# Patient Record
Sex: FEMALE | Race: WHITE | NOT HISPANIC OR LATINO | Employment: OTHER | URBAN - METROPOLITAN AREA
[De-identification: names, ages, dates, MRNs, and addresses within clinical notes are randomized per-mention and may not be internally consistent; named-entity substitution may affect disease eponyms.]

---

## 2019-09-24 ENCOUNTER — HISTORICAL (OUTPATIENT)
Dept: PREADMISSION TESTING | Facility: HOSPITAL | Age: 74
End: 2019-09-24

## 2019-09-24 LAB
ABS NEUT (OLG): 1.94 X10(3)/MCL (ref 2.1–9.2)
ALBUMIN SERPL-MCNC: 4.5 GM/DL (ref 3.4–5)
ALBUMIN/GLOB SERPL: 1.9 {RATIO}
ALP SERPL-CCNC: 70 UNIT/L (ref 38–126)
ALT SERPL-CCNC: 24 UNIT/L (ref 12–78)
APTT PPP: 31.2 SECOND(S) (ref 24.2–33.9)
AST SERPL-CCNC: 16 UNIT/L (ref 15–37)
BASOPHILS # BLD AUTO: 0 X10(3)/MCL (ref 0–0.2)
BASOPHILS NFR BLD AUTO: 1 %
BILIRUB SERPL-MCNC: 0.8 MG/DL (ref 0.2–1)
BILIRUBIN DIRECT+TOT PNL SERPL-MCNC: 0.1 MG/DL (ref 0–0.2)
BILIRUBIN DIRECT+TOT PNL SERPL-MCNC: 0.7 MG/DL (ref 0–0.8)
BUN SERPL-MCNC: 10 MG/DL (ref 7–18)
CALCIUM SERPL-MCNC: 9.6 MG/DL (ref 8.5–10.1)
CHLORIDE SERPL-SCNC: 104 MMOL/L (ref 98–107)
CO2 SERPL-SCNC: 31 MMOL/L (ref 21–32)
CREAT SERPL-MCNC: 0.63 MG/DL (ref 0.55–1.02)
EOSINOPHIL # BLD AUTO: 0.2 X10(3)/MCL (ref 0–0.9)
EOSINOPHIL NFR BLD AUTO: 4 %
ERYTHROCYTE [DISTWIDTH] IN BLOOD BY AUTOMATED COUNT: 13.1 % (ref 11.5–17)
GLOBULIN SER-MCNC: 2.4 GM/DL (ref 2.4–3.5)
GLUCOSE SERPL-MCNC: 93 MG/DL (ref 74–106)
HCT VFR BLD AUTO: 37.5 % (ref 37–47)
HGB BLD-MCNC: 12.6 GM/DL (ref 12–16)
INR PPP: 1 (ref 0–1.3)
LYMPHOCYTES # BLD AUTO: 2.2 X10(3)/MCL (ref 0.6–4.6)
LYMPHOCYTES NFR BLD AUTO: 47 %
MCH RBC QN AUTO: 31.8 PG (ref 27–31)
MCHC RBC AUTO-ENTMCNC: 33.6 GM/DL (ref 33–36)
MCV RBC AUTO: 94.7 FL (ref 80–94)
MONOCYTES # BLD AUTO: 0.3 X10(3)/MCL (ref 0.1–1.3)
MONOCYTES NFR BLD AUTO: 6 %
NEUTROPHILS # BLD AUTO: 1.94 X10(3)/MCL (ref 2.1–9.2)
NEUTROPHILS NFR BLD AUTO: 41 %
PLATELET # BLD AUTO: 297 X10(3)/MCL (ref 130–400)
PMV BLD AUTO: 10.2 FL (ref 9.4–12.4)
POTASSIUM SERPL-SCNC: 4.2 MMOL/L (ref 3.5–5.1)
PROT SERPL-MCNC: 6.9 GM/DL (ref 6.4–8.2)
PROTHROMBIN TIME: 13.4 SECOND(S) (ref 12–14)
RBC # BLD AUTO: 3.96 X10(6)/MCL (ref 4.2–5.4)
SODIUM SERPL-SCNC: 140 MMOL/L (ref 136–145)
WBC # SPEC AUTO: 4.7 X10(3)/MCL (ref 4.5–11.5)

## 2019-10-09 ENCOUNTER — HISTORICAL (OUTPATIENT)
Dept: SURGERY | Facility: HOSPITAL | Age: 74
End: 2019-10-09

## 2020-07-06 RX ORDER — LOSARTAN POTASSIUM 100 MG/1
TABLET ORAL
COMMUNITY
End: 2022-06-07

## 2020-07-06 RX ORDER — HYDROCHLOROTHIAZIDE 12.5 MG/1
TABLET ORAL
COMMUNITY
End: 2021-10-14

## 2020-07-06 RX ORDER — AMITRIPTYLINE HYDROCHLORIDE 25 MG/1
TABLET, FILM COATED ORAL
COMMUNITY
End: 2021-10-14

## 2020-07-06 RX ORDER — LOSARTAN POTASSIUM AND HYDROCHLOROTHIAZIDE 12.5; 1 MG/1; MG/1
1 TABLET ORAL DAILY
COMMUNITY
Start: 2020-05-27 | End: 2021-10-14

## 2020-07-06 NOTE — TELEPHONE ENCOUNTER
Spoke with the patient and she would like a Rx for the Premarin cream sent to her pharmacy and she would also like to  a couple more samples of the premarin either later this week or next week. Pharmacy updated.

## 2020-07-06 NOTE — TELEPHONE ENCOUNTER
----- Message from La Nena Reyes sent at 7/6/2020 11:38 AM CDT -----  Regarding: Sample medication  Patient need to speak to nurse regarding sample medications given to her. The patient would like a prescription for the sample medication. Call back number [phone]. Tks

## 2020-07-09 ENCOUNTER — TELEPHONE (OUTPATIENT)
Dept: OBSTETRICS AND GYNECOLOGY | Facility: CLINIC | Age: 75
End: 2020-07-09

## 2020-07-09 DIAGNOSIS — Z78.0 MENOPAUSE: Primary | ICD-10-CM

## 2020-07-09 RX ORDER — ESTRADIOL 0.1 MG/G
1 CREAM VAGINAL
Qty: 42.5 G | Refills: 1 | Status: SHIPPED | OUTPATIENT
Start: 2020-07-09 | End: 2021-10-14

## 2020-07-09 NOTE — TELEPHONE ENCOUNTER
----- Message from Radha Steinberg sent at 7/9/2020 10:22 AM CDT -----  Pt calling to discuss the conjugated estrogens (PREMARIN) vaginal cream she stated it is to high at the pharmacy please give her a call. [phone]       Thanks   Radha Steinberg

## 2020-10-01 ENCOUNTER — TELEPHONE (OUTPATIENT)
Dept: OBSTETRICS AND GYNECOLOGY | Facility: CLINIC | Age: 75
End: 2020-10-01

## 2020-10-01 NOTE — TELEPHONE ENCOUNTER
----- Message from Torrie Villa sent at 10/1/2020  9:28 AM CDT -----  Patient has an appointment 10/19/2020 states she had surgery in August and states there is no way Dr Hutson can exam her but is in town today she lives in [place] & would like to get her Mammogram orders

## 2020-12-28 ENCOUNTER — OFFICE VISIT (OUTPATIENT)
Dept: UROLOGY | Facility: CLINIC | Age: 75
End: 2020-12-28
Payer: MEDICARE

## 2020-12-28 VITALS
DIASTOLIC BLOOD PRESSURE: 71 MMHG | SYSTOLIC BLOOD PRESSURE: 124 MMHG | RESPIRATION RATE: 17 BRPM | BODY MASS INDEX: 25.68 KG/M2 | HEIGHT: 61 IN | WEIGHT: 136 LBS

## 2020-12-28 DIAGNOSIS — R30.0 DYSURIA: Primary | ICD-10-CM

## 2020-12-28 PROCEDURE — 99213 PR OFFICE/OUTPT VISIT, EST, LEVL III, 20-29 MIN: ICD-10-PCS | Mod: S$GLB,,, | Performed by: UROLOGY

## 2020-12-28 PROCEDURE — 99213 OFFICE O/P EST LOW 20 MIN: CPT | Mod: S$GLB,,, | Performed by: UROLOGY

## 2020-12-28 NOTE — PROGRESS NOTES
Subjective:       Patient ID: Dottie Xie is a 75 y.o. female.    Chief Complaint: Follow-up      HPI: History of utis yearly fu has had no recurrences is past year       Past Medical History:   Past Medical History:   Diagnosis Date    HTN (hypertension)        Past Surgical Historical:   Past Surgical History:   Procedure Laterality Date    BREAST LUMPECTOMY Right     CATARACT EXTRACTION      HYSTERECTOMY          Medications:   Medication List with Changes/Refills   Current Medications    AMITRIPTYLINE (ELAVIL) 25 MG TABLET    amitriptyline 25 mg tablet   TAKE ONE TABLET BY MOUTH ONCE DAILY    CONJUGATED ESTROGENS (PREMARIN) VAGINAL CREAM    Place 1 g vaginally twice a week.    ESTRADIOL (ESTRACE) 0.01 % (0.1 MG/GRAM) VAGINAL CREAM    Place 1 g vaginally every 7 days.    HYDROCHLOROTHIAZIDE (HYDRODIURIL) 12.5 MG TAB    hydrochlorothiazide 12.5 mg tablet   Take 1 tablet every day by oral route.    LOSARTAN (COZAAR) 100 MG TABLET    losartan 100 mg tablet   Take 1/2 tablet po daily for 7 days then take 1 po daily thereafter    LOSARTAN-HYDROCHLOROTHIAZIDE 100-12.5 MG (HYZAAR) 100-12.5 MG TAB    Take 1 tablet by mouth once daily.        Past Social History:   Social History     Socioeconomic History    Marital status:      Spouse name: Not on file    Number of children: Not on file    Years of education: Not on file    Highest education level: Not on file   Occupational History    Not on file   Social Needs    Financial resource strain: Not on file    Food insecurity     Worry: Not on file     Inability: Not on file    Transportation needs     Medical: Not on file     Non-medical: Not on file   Tobacco Use    Smoking status: Never Smoker   Substance and Sexual Activity    Alcohol use: Yes     Comment: OCC.    Drug use: Not on file    Sexual activity: Not on file   Lifestyle    Physical activity     Days per week: Not on file     Minutes per session: Not on file    Stress: Not on file    Relationships    Social connections     Talks on phone: Not on file     Gets together: Not on file     Attends [employer] service: Not on file     Active member of club or organization: Not on file     Attends meetings of clubs or organizations: Not on file     Relationship status: Not on file   Other Topics Concern    Not on file   Social History Narrative    Not on file       Allergies:   Review of patient's allergies indicates:   Allergen Reactions    Penicillins Rash    Triple antibiotic  [dxust-tmeht-vnjaych-pramoxine] Rash        Family History: No family history on file.     Review of Systems:  Review of Systems   Constitutional: Negative for activity change and appetite change.   HENT: Negative for congestion and dental problem.    Respiratory: Negative for chest tightness and shortness of breath.    Cardiovascular: Negative for chest pain.   Gastrointestinal: Negative for abdominal distention.   Genitourinary: Negative for decreased urine volume, difficulty urinating, dyspareunia, dysuria, enuresis, flank pain, frequency, genital sores, hematuria, pelvic pain and urgency.   Musculoskeletal: Negative for back pain and neck pain.   Allergic/Immunologic: Negative for immunocompromised state.   Neurological: Negative for dizziness.   Hematological: Negative for adenopathy.   Psychiatric/Behavioral: Negative for agitation, behavioral problems and confusion.       Physical Exam:  Physical Exam  Vitals signs and nursing note reviewed.   Constitutional:       Appearance: She is well-developed.   HENT:      Head: Normocephalic.   Cardiovascular:      Rate and Rhythm: Normal rate and regular rhythm.      Heart sounds: Normal heart sounds.   Pulmonary:      Effort: Pulmonary effort is normal.      Breath sounds: Normal breath sounds.   Abdominal:      General: Bowel sounds are normal.      Palpations: Abdomen is soft.   Skin:     General: Skin is warm and dry.   Neurological:      Mental Status: She is alert and  oriented to person, place, and time.     ua is normal    Assessment/Plan:     utis with no recurrences fu in one year  Problem List Items Addressed This Visit     None      Visit Diagnoses     Dysuria    -  Primary    Relevant Orders    POCT Urinalysis (w/Micro Option)

## 2021-10-14 ENCOUNTER — OFFICE VISIT (OUTPATIENT)
Dept: OBSTETRICS AND GYNECOLOGY | Facility: CLINIC | Age: 76
End: 2021-10-14
Payer: MEDICARE

## 2021-10-14 VITALS
HEIGHT: 61 IN | BODY MASS INDEX: 27.56 KG/M2 | WEIGHT: 146 LBS | HEART RATE: 73 BPM | DIASTOLIC BLOOD PRESSURE: 68 MMHG | SYSTOLIC BLOOD PRESSURE: 143 MMHG

## 2021-10-14 DIAGNOSIS — Z00.00 ENCOUNTER FOR WELLNESS EXAMINATION: Primary | ICD-10-CM

## 2021-10-14 DIAGNOSIS — Z12.31 ENCOUNTER FOR SCREENING MAMMOGRAM FOR MALIGNANT NEOPLASM OF BREAST: ICD-10-CM

## 2021-10-14 PROCEDURE — G0101 CA SCREEN;PELVIC/BREAST EXAM: HCPCS | Mod: S$GLB,,, | Performed by: OBSTETRICS & GYNECOLOGY

## 2021-10-14 PROCEDURE — G0101 PR CA SCREEN;PELVIC/BREAST EXAM: ICD-10-PCS | Mod: S$GLB,,, | Performed by: OBSTETRICS & GYNECOLOGY

## 2021-10-14 RX ORDER — ESTRADIOL 0.1 MG/G
CREAM VAGINAL DAILY
COMMUNITY
End: 2021-12-21

## 2021-12-08 ENCOUNTER — TELEPHONE (OUTPATIENT)
Dept: OBSTETRICS AND GYNECOLOGY | Facility: CLINIC | Age: 76
End: 2021-12-08
Payer: MEDICARE

## 2022-01-12 ENCOUNTER — OFFICE VISIT (OUTPATIENT)
Dept: UROLOGY | Facility: CLINIC | Age: 77
End: 2022-01-12
Payer: MEDICARE

## 2022-01-12 DIAGNOSIS — R30.0 DYSURIA: Primary | ICD-10-CM

## 2022-01-12 PROCEDURE — 99213 OFFICE O/P EST LOW 20 MIN: CPT | Mod: S$GLB,,, | Performed by: NURSE PRACTITIONER

## 2022-01-12 PROCEDURE — 99213 PR OFFICE/OUTPT VISIT, EST, LEVL III, 20-29 MIN: ICD-10-PCS | Mod: S$GLB,,, | Performed by: NURSE PRACTITIONER

## 2022-01-12 RX ORDER — CODEINE PHOSPHATE AND GUAIFENESIN 10; 100 MG/5ML; MG/5ML
SOLUTION ORAL
COMMUNITY
Start: 2022-01-04 | End: 2022-06-07

## 2022-01-12 RX ORDER — LOSARTAN POTASSIUM AND HYDROCHLOROTHIAZIDE 12.5; 1 MG/1; MG/1
TABLET ORAL
COMMUNITY
Start: 2021-12-13

## 2022-01-12 NOTE — PROGRESS NOTES
Subjective:       Patient ID: Dottie Xie is a 76 y.o. female.    Chief Complaint: Other (Yearly, denies any issues)      HPI: 76-year-old female yearly follow-up history of atrophic vaginitis and recurring UTI.  She has done well since going on Estrace vaginal cream.  She continue same.  She has had no UTIs over the last year.  She has no urologic concerns today.       Past Medical History:   Past Medical History:   Diagnosis Date    Atrophic vaginitis     Bone disorder     HTN (hypertension)     Insomnia     Menopausal syndrome     Osteopenia        Past Surgical Historical:   Past Surgical History:   Procedure Laterality Date    BREAST LUMPECTOMY Right     CATARACT EXTRACTION      FOOT SURGERY      hemangioma removal      LEG SURGERY      TOTAL ABDOMINAL HYSTERECTOMY      WITHOUT BSO        Medications:   Medication List with Changes/Refills   Current Medications    ESTRADIOL (ESTRACE) 0.01 % (0.1 MG/GRAM) VAGINAL CREAM    APPLY 1 GRAM VAGINALLY EVERY 7 DAYS    GUAIFENESIN-CODEINE 100-10 MG/5 ML (TUSSI-ORGANIDIN NR)  MG/5 ML SYRUP    TAKE 2 TEASPOONFULS (10 ML) BY MOUTH EVERY 4 TO 6 HOURS AS NEEDED    LOSARTAN (COZAAR) 100 MG TABLET    losartan 100 mg tablet   Take 1/2 tablet po daily for 7 days then take 1 po daily thereafter    LOSARTAN-HYDROCHLOROTHIAZIDE 100-12.5 MG (HYZAAR) 100-12.5 MG TAB            Past Social History:   Social History     Socioeconomic History    Marital status:    Tobacco Use    Smoking status: Never Smoker    Smokeless tobacco: Never Used   Substance and Sexual Activity    Alcohol use: Yes     Comment: OCC.    Drug use: Never    Sexual activity: Not Currently     Partners: Male     Birth control/protection: See Surgical Hx       Allergies:   Review of patient's allergies indicates:   Allergen Reactions    Vicodin [hydrocodone-acetaminophen]     Penicillins Rash    Triple antibiotic  [vtqzl-zgdxh-xtlicrs-pramoxine] Rash        Family History:   Family  History   Problem Relation Age of Onset    Breast cancer Maternal Grandmother         Review of Systems:  Review of Systems   Constitutional: Negative for activity change and appetite change.   HENT: Negative for congestion and dental problem.    Respiratory: Negative for chest tightness and shortness of breath.    Cardiovascular: Negative for chest pain.   Gastrointestinal: Negative for abdominal distention.   Genitourinary: Negative for decreased urine volume, difficulty urinating, dyspareunia, dysuria, enuresis, flank pain, frequency, genital sores, hematuria, pelvic pain and urgency.   Musculoskeletal: Negative for back pain and neck pain.   Allergic/Immunologic: Negative for immunocompromised state.   Neurological: Negative for dizziness.   Hematological: Negative for adenopathy.   Psychiatric/Behavioral: Negative for agitation, behavioral problems and confusion.       Physical Exam:  Physical Exam  Constitutional:       Appearance: She is well-developed and well-nourished.   HENT:      Head: Normocephalic and atraumatic.   Eyes:      General: No scleral icterus.  Cardiovascular:      Pulses: Intact distal pulses.   Pulmonary:      Effort: Pulmonary effort is normal.      Breath sounds: Normal breath sounds.   Abdominal:      General: There is no distension.      Palpations: Abdomen is soft.      Tenderness: There is no abdominal tenderness.   Genitourinary:     Exam position: Supine.      Labia:         Right: No rash, tenderness or lesion.         Left: No rash, tenderness or lesion.       Vagina: Normal.      Rectum: Normal.   Musculoskeletal:         General: No edema.      Cervical back: Normal range of motion.   Skin:     General: Skin is warm and dry.   Neurological:      Mental Status: She is alert and oriented to person, place, and time.   Psychiatric:         Mood and Affect: Mood and affect normal.         Assessment/Plan:   Atrophic vaginitis--continue Estrace vaginal cream adequate supply on hand.     Problem List Items Addressed This Visit    None

## 2022-04-30 NOTE — OP NOTE
DATE OF SURGERY:        SURGEON:  Ike Watson MD  ASSISTANT:  Carmen Barnett RN    PREOPERATIVE DIAGNOSES:    1. Blepharoptosis.  2. Aging face.    POSTOPERATIVE DIAGNOSES:    1. Blepharoptosis.  2. Aging face.    PROCEDURES:    1. Ptosis repair utilizing an external approach bilaterally.  2. Suborbicularis oculi fat lift lower lid blepharoplasty with canthopexy.  3. Cervical facial liposuction, platysmaplasty and face lift.  4. Periorbital and perioral fractionated laser resurfacing.    PROCEDURE IN DETAIL:  The patient was brought to the operating room and placed in supine position.  After achieving intravenous sedation with local anesthesia and infiltration with 2% lidocaine with 1:100,000 epinephrine, the face was prepped and draped for surgery.  Addressing the right eye first, a predetermined amount of upper eyelid skin was removed.  A strip of orbicularis oculi muscle was taken and hemostasis achieved using bipolar cautery.  The orbital septum was opened exposing the periorbital fat which was retracted superiorly.  Then, the levator was shortened appropriately, exposed the anterior surface of the tarsus with 6-0 Vicryl sutures used to sew the freshened edge of the tarsus to the anterior surface of the levator.  Patient opened eyes showing marked improvement in MRD.  The same procedure was done on the opposite side.  The wound was then closed with 6-0 nylon in subcuticular fashion, where the area was stabilized with a Steri-Strip over the forehead.  Once this was done, general endotracheal anesthesia was instituted.     Lower blepharoplasty with SOOF lift fat preservation:     We turned our attention to a fat preservation lower lid blepharoplasty with lateral canthopexy which was done by using a #15 blade to make an incision in the lateral canthus.  With the use of tenotomy scissors an inferior cantholysis was done and with the use of [place] attachment of the Bovie, a transconjunctival incision  was made going through the conjunctiva and lower lid retractors.  Once this was completed, fat in the lateral most compartments was removed using the clamp, cut, cautery technique while fat in the nasal and central compartments was not resected.  However, the fat in these areas were sewn to the SOOF beneath the orbicularis oculi muscle.  This was done with 4-0 Vicryl sutures in an interrupted fashion.  Once this was done, the septum was cauterized and the lower eyelid retracted laterally and superiorly where excess lid was removed with sharp dissection.  A 5-0 clear nylon suture was used to approximate the tarsus to the periosteum of the lateral rim.  5-0 plain sutures were then used to reattach the gray line as well as to reapproximate the remaining skin edges.  This was also done bilaterally.  The cornea protectors were then removed and a generous amount of Lacri-Lube was placed over the eye and eye was covered with cool compresses.  The patient tolerated the procedure well and was extubated and awakened in the operating room and brought to the recovery room in stable condition.     After completion of the blepharoplasty, we turned our attention to the canthopexy to tighten the lower lid where an incision was made just in the crow feet region where a strip of orbicularis was taken, as well as skin, and a 5-0 PDS was then used to plicate to the lateral orbital rim, in an attempt to tighten the lid, it was then closed with 5-0 fast-absorbing gut sutures.  This was done bilaterally as well.       The face and neck were prepped and draped for a facelift surgery.  Once this was completed, facelift incisions were made in the pre-auricular area as well as the post-auricular areas and out to the occipital scalp, as well as the small incision that was made in the submental region.       With this, close liposuction was done by making tunnels prior to turning the vacuum going extensively into the submental region as well as  into the jowls bilaterally.  Once the tunnels were made, the vacuum was turned on and liposuction was carried out throughout both jowls as well as in the submental region with care being taken to leave the open end of the cannula downward and using a rapid to and fro motion, until an appropriate amount of fat was removed.  Then the medial border of the platysma were grasped and sutured in running lock fashion with 3-0 Vicryl suture.       Once this was done, we turned our attention to the facelift where anterior, inferior and posterior flaps were elevated in the subcutaneous plane.  Hemostasis was achieved using the bipolar cautery.  A limited amount of liposuction was done in the pre-auricular region and infralobular region and hemostasis was achieved using the bipolar cautery.  Using a 2-0 Prolene suture the posterior edge of the platysma was sutured to mastoid periosteum.  Using 2-0 PDS sutures, the SMAS was plicated.  Once this was done, the excess skin in front of the ears was pulled anteriorly and behind the ear posteriorly and the excess skin was removed.  The skin incisions in the scalp were closed with clips while the pre-auricular incisions was closed with 5-0 fast absorbing gut and the post-auricular incisions were closed with 5-0 plain sutures all in a running lock fashion after spraying Crosseal diffusely over the underlying tissue.  This procedure was done bilaterally.     After completion of that, we turned our attention the fractional laser resurfacing where all operating room personnel were protected with cool clear goggles, the patient with lead shields, and using the laser in the following manner, the periorbital area was done with 1 pass under the lower lid at 22 bedolla 2 milliseconds and 80% overlap, while the lateral canthal crow feet region was done with 2 passes in at the same setting, as was the perioral region done with 2 passes at the same settings.  Vaseline was generously applied to these  areas.     The patient tolerated the procedure well and was extubated and awakened in the operating room and brought to the recovery room in stable condition.        ______________________________  Ike Watson MD    [facility]  DD:  10/16/2019  Time:  01:34PM  DT:  10/16/2019  Time:  02:04PM  Job #:  [id number]

## 2022-04-30 NOTE — OP NOTE
Patient:   Dottie Xie            MRN: [id number]            FIN: [id number]               Age:   73 years     Sex:  Female     :  1945   Associated Diagnoses:   None   Author:   Paul Ruiz MD            DATE OF SURGERY: 10/9/19       SURGEON:  Paul Ruiz MD    ASSISTANT:  None    PREOPERATIVE DIAGNOSIS:  Soft Tissue Mass - Left Upper Extremity    POSTOPERATIVE DIAGNOSIS:  Same.    OPERATIONS:  Excision of Soft Tissue Mass    Anesthesia:  General IV sedation / Local   Estimated blood loss: Minimal (< 20)  Blood administered:  None.   Lap and instrument counts correct x 2 at the end of the case.    SPECIMEN: Soft tissue Mass approximately 3 cm    INDICATIONS/SIGNIFICANT HISTORY:  The patient is a 74 year old female who seen with complaints of a soft tissue mass.   Risks and Benefits of surgical excision was discussed with the patient, who voiced understanding of risks and benefits and elected to proceed with surgery.    PROCEDURE IN DETAIL:  Once informed consents were obtained, the patient was taken to the operating room and placed on the operating table.  After anesthesia was induced, the marked area was prepped and draped in a standard surgical fashion.  Local anesthesia was obtained with 0.25% marcaine with epinephrine.  An incision was made around the soft tissue mass and it was dissected with blunt and sharp dissection which appeared intra-muscular and the mass was passed off the table as a specimen.  The wound was then irrigated and dried and bleeding stopped with cautery.  The skin was then close with suture.  The wounds were cleaned and dried and steri-strips were applied.  The patient tolerated the procedure well and was transported to recovery room in good condition.

## 2022-05-11 ENCOUNTER — TELEPHONE (OUTPATIENT)
Dept: OBSTETRICS AND GYNECOLOGY | Facility: CLINIC | Age: 77
End: 2022-05-11
Payer: MEDICARE

## 2022-05-11 NOTE — TELEPHONE ENCOUNTER
----- Message from America Ramirez sent at 5/11/2022  3:14 PM CDT -----  Contact: Dottie Weldon is requesting a call to schedule an new pt appointment. Please call her back at [phone].        Thanks  DD

## 2022-06-07 ENCOUNTER — OFFICE VISIT (OUTPATIENT)
Dept: OBSTETRICS AND GYNECOLOGY | Facility: CLINIC | Age: 77
End: 2022-06-07
Payer: MEDICARE

## 2022-06-07 ENCOUNTER — OFFICE VISIT (OUTPATIENT)
Dept: UROLOGY | Facility: CLINIC | Age: 77
End: 2022-06-07
Payer: MEDICARE

## 2022-06-07 VITALS
SYSTOLIC BLOOD PRESSURE: 164 MMHG | BODY MASS INDEX: 28.32 KG/M2 | WEIGHT: 150 LBS | HEIGHT: 61 IN | DIASTOLIC BLOOD PRESSURE: 81 MMHG

## 2022-06-07 VITALS
SYSTOLIC BLOOD PRESSURE: 135 MMHG | WEIGHT: 146 LBS | HEIGHT: 61 IN | BODY MASS INDEX: 27.56 KG/M2 | DIASTOLIC BLOOD PRESSURE: 64 MMHG | HEART RATE: 64 BPM

## 2022-06-07 DIAGNOSIS — N95.2 ATROPHIC VAGINITIS: Primary | ICD-10-CM

## 2022-06-07 DIAGNOSIS — N90.89 VULVAR LESION: Primary | ICD-10-CM

## 2022-06-07 PROCEDURE — 99213 OFFICE O/P EST LOW 20 MIN: CPT | Mod: S$GLB,,, | Performed by: OBSTETRICS & GYNECOLOGY

## 2022-06-07 PROCEDURE — 99213 OFFICE O/P EST LOW 20 MIN: CPT | Mod: S$GLB,,, | Performed by: UROLOGY

## 2022-06-07 PROCEDURE — 99213 PR OFFICE/OUTPT VISIT, EST, LEVL III, 20-29 MIN: ICD-10-PCS | Mod: S$GLB,,, | Performed by: UROLOGY

## 2022-06-07 PROCEDURE — 99213 PR OFFICE/OUTPT VISIT, EST, LEVL III, 20-29 MIN: ICD-10-PCS | Mod: S$GLB,,, | Performed by: OBSTETRICS & GYNECOLOGY

## 2022-06-07 NOTE — PROGRESS NOTES
Subjective:       Patient ID: Dottie Xie is a 76 y.o. female.    Chief Complaint:  No chief complaint on file.      History of Present Illness  HPI  Patient presents today with complaints of lesion on the labia     GYN & OB History  No LMP recorded. Patient has had a hysterectomy.   Date of Last Pap: No result found    OB History    Para Term  AB Living   2 2 2         SAB IAB Ectopic Multiple Live Births                  # Outcome Date GA Lbr Aram/2nd Weight Sex Delivery Anes PTL Lv   2 Term      Vag-Spont      1 Term      Vag-Spont          Review of Systems  Review of Systems   Gastrointestinal: Negative for abdominal pain, bloating, blood in stool, constipation, diarrhea, nausea, vomiting, reflux and fecal incontinence.   Genitourinary: Negative for bladder incontinence, decreased libido, dysmenorrhea, dyspareunia, dysuria, flank pain, frequency, genital sores, hematuria, hot flashes, menorrhagia, menstrual problem, pelvic pain, urgency, vaginal bleeding, vaginal discharge, vaginal pain, urinary incontinence, postcoital bleeding, postmenopausal bleeding, vaginal dryness and vaginal odor.        Lesion on the left lower labia            Objective:    Physical Exam:               Genitourinary:          Genitourinary Comments: 1 cm area non tender mobile mass under the skin                           Assessment:        1. Vulvar lesion       Vulvar lesion               Plan:      Follow up in about 1 week (around 2022).  When in 1 week to see if this has formed anything that I can remove this is nonpainful at this time

## 2022-06-07 NOTE — PROGRESS NOTES
Subjective:       Patient ID: Dottie Xie is a 76 y.o. female.    Chief Complaint: Dysuria      HPI:  76-year-old female followed for atrophic vaginitis she has been given Estrace vaginal cream.  She states this is been working she is continuing to take this this time does not need a refill.  He occasionally has urinary tract infections urinalysis today was normal    Past Medical History:   Past Medical History:   Diagnosis Date    Atrophic vaginitis     Bone disorder     HTN (hypertension)     Insomnia     Menopausal syndrome     Osteopenia        Past Surgical Historical:   Past Surgical History:   Procedure Laterality Date    BREAST LUMPECTOMY Right     CATARACT EXTRACTION      FOOT SURGERY      hemangioma removal      LEG SURGERY      TOTAL ABDOMINAL HYSTERECTOMY      WITHOUT BSO        Medications:   Medication List with Changes/Refills   Current Medications    ESTRADIOL (ESTRACE) 0.01 % (0.1 MG/GRAM) VAGINAL CREAM    APPLY 1 GRAM VAGINALLY EVERY 7 DAYS    LOSARTAN-HYDROCHLOROTHIAZIDE 100-12.5 MG (HYZAAR) 100-12.5 MG TAB       Discontinued Medications    GUAIFENESIN-CODEINE 100-10 MG/5 ML (TUSSI-ORGANIDIN NR)  MG/5 ML SYRUP    TAKE 2 TEASPOONFULS (10 ML) BY MOUTH EVERY 4 TO 6 HOURS AS NEEDED    LOSARTAN (COZAAR) 100 MG TABLET    losartan 100 mg tablet   Take 1/2 tablet po daily for 7 days then take 1 po daily thereafter        Past Social History:   Social History     Socioeconomic History    Marital status:    Tobacco Use    Smoking status: Never Smoker    Smokeless tobacco: Never Used   Substance and Sexual Activity    Alcohol use: Yes     Comment: OCC.    Drug use: Never    Sexual activity: Not Currently     Partners: Male     Birth control/protection: See Surgical Hx       Allergies:   Review of patient's allergies indicates:   Allergen Reactions    Vicodin [hydrocodone-acetaminophen]     Penicillins Rash    Triple antibiotic  [dpnbl-zhotb-voypzpy-pramoxine] Rash         Family History:   Family History   Problem Relation Age of Onset    Breast cancer Maternal Grandmother         Review of Systems:  Review of Systems   Constitutional: Negative for activity change and appetite change.   HENT: Negative for congestion and dental problem.    Respiratory: Negative for chest tightness and shortness of breath.    Cardiovascular: Negative for chest pain.   Gastrointestinal: Negative for abdominal distention and abdominal pain.   Genitourinary: Negative for decreased urine volume, difficulty urinating, dyspareunia, dysuria, enuresis, flank pain, frequency, genital sores, hematuria, pelvic pain and urgency.   Musculoskeletal: Negative for back pain and neck pain.   Allergic/Immunologic: Negative for immunocompromised state.   Neurological: Negative for dizziness.   Hematological: Negative for adenopathy.   Psychiatric/Behavioral: Negative for agitation, behavioral problems and confusion.       Physical Exam:  Physical Exam  Constitutional:       Appearance: She is well-developed.   HENT:      Head: Normocephalic and atraumatic.      Right Ear: External ear normal.      Left Ear: External ear normal.   Eyes:      Conjunctiva/sclera: Conjunctivae normal.   Neck:      Vascular: No JVD.   Cardiovascular:      Rate and Rhythm: Normal rate and regular rhythm.   Pulmonary:      Effort: Pulmonary effort is normal. No respiratory distress.      Breath sounds: Normal breath sounds. No wheezing.   Abdominal:      General: There is no distension.      Palpations: Abdomen is soft.      Tenderness: There is no abdominal tenderness. There is no rebound.   Musculoskeletal:         General: Normal range of motion.      Cervical back: Normal range of motion.   Skin:     General: Skin is warm and dry.      Findings: No erythema or rash.   Neurological:      Mental Status: She is alert and oriented to person, place, and time.   Psychiatric:         Behavior: Behavior normal.         Assessment/Plan:        Problem List Items Addressed This Visit    None            Atrophic vaginitis:  We will refill her Estrace as needed return to clinic in a year

## 2022-06-13 ENCOUNTER — PROCEDURE VISIT (OUTPATIENT)
Dept: OBSTETRICS AND GYNECOLOGY | Facility: CLINIC | Age: 77
End: 2022-06-13
Payer: MEDICARE

## 2022-06-13 VITALS — WEIGHT: 150 LBS | HEIGHT: 61 IN | BODY MASS INDEX: 28.32 KG/M2

## 2022-06-13 DIAGNOSIS — N90.89 VULVAR LESION: Primary | ICD-10-CM

## 2022-06-13 DIAGNOSIS — G89.18 POST-OP PAIN: ICD-10-CM

## 2022-06-13 PROCEDURE — 99499 UNLISTED E&M SERVICE: CPT | Mod: S$GLB,,, | Performed by: OBSTETRICS & GYNECOLOGY

## 2022-06-13 PROCEDURE — 56501 PR DESTRUCTION,LESION(S),VULVA,SIMPLE: ICD-10-PCS | Mod: S$GLB,,, | Performed by: OBSTETRICS & GYNECOLOGY

## 2022-06-13 PROCEDURE — 99499 NO LOS: ICD-10-PCS | Mod: S$GLB,,, | Performed by: OBSTETRICS & GYNECOLOGY

## 2022-06-13 PROCEDURE — 56501 DESTROY VULVA LESIONS SIM: CPT | Mod: S$GLB,,, | Performed by: OBSTETRICS & GYNECOLOGY

## 2022-06-13 RX ORDER — HYDROCODONE BITARTRATE AND ACETAMINOPHEN 5; 325 MG/1; MG/1
1 TABLET ORAL EVERY 6 HOURS PRN
Qty: 10 TABLET | Refills: 0 | Status: SHIPPED | OUTPATIENT
Start: 2022-06-13 | End: 2022-11-03

## 2022-06-13 NOTE — PROCEDURES
Procedures   Area was cleaned with Betadine and the lidocaine was injected into the underlying lesion it was cut out using the scalpel and then several 2-0 chromic sutures were used to close the defect in interrupted fashion until hemostasis was noted.

## 2022-11-03 ENCOUNTER — OFFICE VISIT (OUTPATIENT)
Dept: OBSTETRICS AND GYNECOLOGY | Facility: CLINIC | Age: 77
End: 2022-11-03
Payer: MEDICARE

## 2022-11-03 VITALS
DIASTOLIC BLOOD PRESSURE: 72 MMHG | SYSTOLIC BLOOD PRESSURE: 146 MMHG | BODY MASS INDEX: 28.72 KG/M2 | HEART RATE: 66 BPM | WEIGHT: 152 LBS

## 2022-11-03 DIAGNOSIS — N95.2 VAGINAL ATROPHY: ICD-10-CM

## 2022-11-03 DIAGNOSIS — Z12.31 ENCOUNTER FOR SCREENING MAMMOGRAM FOR MALIGNANT NEOPLASM OF BREAST: Primary | ICD-10-CM

## 2022-11-03 PROCEDURE — G0101 PR CA SCREEN;PELVIC/BREAST EXAM: ICD-10-PCS | Mod: GZ,,, | Performed by: OBSTETRICS & GYNECOLOGY

## 2022-11-03 PROCEDURE — G0101 CA SCREEN;PELVIC/BREAST EXAM: HCPCS | Mod: GZ,,, | Performed by: OBSTETRICS & GYNECOLOGY

## 2022-11-04 RX ORDER — ESTRADIOL 0.1 MG/G
CREAM VAGINAL
Qty: 43 G | Refills: 3 | Status: SHIPPED | OUTPATIENT
Start: 2022-11-04

## 2022-11-04 NOTE — PROGRESS NOTES
Subjective:       Patient ID: Dottie Xie is a 77 y.o. female.    Chief Complaint:  Well Woman (Pt sees [facility] )      History of Present Illness  Pt here for gyn annual.  History and past labs reviewed with patient.    Complaints none       Review of Systems  Review of Systems   Constitutional:  Negative for chills and fever.   Respiratory:  Negative for shortness of breath.    Cardiovascular:  Negative for chest pain.   Gastrointestinal:  Negative for abdominal pain, blood in stool, constipation, diarrhea, nausea, vomiting and reflux.   Genitourinary:  Negative for dysmenorrhea, dyspareunia, dysuria, hematuria, hot flashes, menorrhagia, menstrual problem, pelvic pain, vaginal bleeding, vaginal discharge, postcoital bleeding and vaginal dryness.   Musculoskeletal:  Negative for arthralgias and joint swelling.   Integumentary:  Negative for rash, hair changes, breast mass, nipple discharge and breast skin changes.   Psychiatric/Behavioral:  Negative for depression. The patient is not nervous/anxious.    Breast: Negative for asymmetry, lump, mass, nipple discharge and skin changes        Objective:     Vitals:    11/03/22 1030   BP: (!) 146/72   Pulse: 66   Weight: 68.9 kg (152 lb)       Physical Exam:   Constitutional: She appears well-developed and well-nourished. No distress.    HENT:   Head: Normocephalic and atraumatic.    Eyes: Conjunctivae and EOM are normal.    Neck: No tracheal deviation present. No thyromegaly present.    Cardiovascular:       Exam reveals no clubbing, no cyanosis and no edema.        Pulmonary/Chest: Effort normal. No respiratory distress.        Abdominal: Soft. She exhibits no distension and no mass. There is no abdominal tenderness. There is no rebound and no guarding. No hernia.     Genitourinary:    Vagina and rectum normal.      Pelvic exam was performed with patient supine.   There is no rash, tenderness, lesion or injury on the right labia. There is no rash, tenderness,  lesion or injury on the left labia. Right adnexum displays no mass, no tenderness and no fullness. Left adnexum displays no mass, no tenderness and no fullness. Vaginal cuff normal.  No  no vaginal discharge in the vagina. Cervix is absent.Uterus is absent.                Skin: Skin is warm and dry. She is not diaphoretic. No cyanosis. Nails show no clubbing.    Psychiatric: She has a normal mood and affect. Her behavior is normal. Judgment and thought content normal.      breast exam wnl- no nipple dc, skin changes, masses or lymph nodes palpated bilaterally    Assessment:        1. Encounter for screening mammogram for malignant neoplasm of breast    2. Vaginal atrophy                Plan:      Annual   Pap no longer indicated   Refilled estrace cream   MMG ordered  TSH, Lipid panel, A1c with PCP   Colonoscopy with PCP   BMD with PCP   Vaccine UTD   Risk assessment for inherited gyn cancer done   RTC  1 year

## 2022-12-13 ENCOUNTER — TELEPHONE (OUTPATIENT)
Dept: OBSTETRICS AND GYNECOLOGY | Facility: CLINIC | Age: 77
End: 2022-12-13
Payer: MEDICARE

## 2022-12-13 ENCOUNTER — TELEPHONE (OUTPATIENT)
Dept: UROLOGY | Facility: CLINIC | Age: 77
End: 2022-12-13
Payer: MEDICARE

## 2022-12-13 NOTE — TELEPHONE ENCOUNTER
----- Message from Cindy Ponce MA sent at 12/12/2022  4:31 PM CST -----  Contact: self    ----- Message -----  From: Lindsey Nowak  Sent: 12/12/2022   4:14 PM CST  To: Artur Dawson Staff    Type:  Patient Returning Call    Who Called:Dottie Xie   Who Left Message for Patient:not sure   Does the patient know what this is regarding?:not sure   Would the patient rather a call back or a response via [facility]? Call   Best Call Back Number:[phone]   Additional Information: na

## 2022-12-13 NOTE — TELEPHONE ENCOUNTER
I called pt back. She states she was trying to call Dr. Siddiqui's office for her lab work results. States she did talk to someone in their office

## 2023-03-20 ENCOUNTER — TELEPHONE (OUTPATIENT)
Dept: GASTROENTEROLOGY | Facility: CLINIC | Age: 78
End: 2023-03-20
Payer: MEDICARE

## 2023-03-20 NOTE — TELEPHONE ENCOUNTER
Returned call to pt and updated her when she is due and its 4/13/2026 every 5 yr health maintenance updated.

## 2023-03-20 NOTE — TELEPHONE ENCOUNTER
----- Message from Li Schaefer RN sent at 3/20/2023  2:38 PM CDT -----  Contact: self    ----- Message -----  From: Maryse Escobar  Sent: 3/20/2023   1:12 PM CDT  To: Judith Spain Staff    Pt needs to schedule colonoscopy , requesting date of last test as well pls call [phone]

## 2023-06-08 ENCOUNTER — OFFICE VISIT (OUTPATIENT)
Dept: UROLOGY | Facility: CLINIC | Age: 78
End: 2023-06-08
Payer: MEDICARE

## 2023-06-08 VITALS
HEIGHT: 61 IN | WEIGHT: 152 LBS | HEART RATE: 63 BPM | BODY MASS INDEX: 28.7 KG/M2 | DIASTOLIC BLOOD PRESSURE: 58 MMHG | SYSTOLIC BLOOD PRESSURE: 124 MMHG

## 2023-06-08 DIAGNOSIS — Z87.440 HISTORY OF UTI: Primary | ICD-10-CM

## 2023-06-08 LAB
BILIRUBIN, UA POC OHS: NEGATIVE
BLOOD, UA POC OHS: NEGATIVE
CLARITY, UA POC OHS: CLEAR
COLOR, UA POC OHS: YELLOW
GLUCOSE, UA POC OHS: NEGATIVE
KETONES, UA POC OHS: NEGATIVE
LEUKOCYTES, UA POC OHS: NEGATIVE
NITRITE, UA POC OHS: NEGATIVE
PH, UA POC OHS: 7
PROTEIN, UA POC OHS: NEGATIVE
SPECIFIC GRAVITY, UA POC OHS: 1.01
UROBILINOGEN, UA POC OHS: 0.2

## 2023-06-08 PROCEDURE — 81003 URINALYSIS AUTO W/O SCOPE: CPT | Mod: QW,S$GLB,, | Performed by: UROLOGY

## 2023-06-08 PROCEDURE — 99214 OFFICE O/P EST MOD 30 MIN: CPT | Mod: S$GLB,,, | Performed by: UROLOGY

## 2023-06-08 PROCEDURE — 99214 PR OFFICE/OUTPT VISIT, EST, LEVL IV, 30-39 MIN: ICD-10-PCS | Mod: S$GLB,,, | Performed by: UROLOGY

## 2023-06-08 PROCEDURE — 81003 POCT URINALYSIS(INSTRUMENT): ICD-10-PCS | Mod: QW,S$GLB,, | Performed by: UROLOGY

## 2023-06-08 NOTE — PROGRESS NOTES
Subjective:       Patient ID: Dottie Xie is a 77 y.o. female.    Chief Complaint: Annual Exam      HPI: 77-year-old female following up for atrophic vaginitis and urinary tract infections. She has been given Estrace vaginal cream.  She states this is been working she is continuing to take this this time does not need a refill.  She occasionally has urinary tract infections urinalysis today was normal       Past Medical History:   Past Medical History:   Diagnosis Date    Atrophic vaginitis     Bone disorder     HTN (hypertension)     Insomnia     Menopausal syndrome     Osteopenia        Past Surgical Historical:   Past Surgical History:   Procedure Laterality Date    BREAST LUMPECTOMY Right     CATARACT EXTRACTION      FOOT SURGERY      hemangioma removal      LEG SURGERY      TOTAL ABDOMINAL HYSTERECTOMY      WITHOUT BSO        Medications:   Medication List with Changes/Refills   Current Medications    ESTRADIOL (ESTRACE) 0.01 % (0.1 MG/GRAM) VAGINAL CREAM    APPLY 1 GRAM VAGINALLY EVERY 7 DAYS Strength: 0.01 % (0.1 mg/gram)    LOSARTAN-HYDROCHLOROTHIAZIDE 100-12.5 MG (HYZAAR) 100-12.5 MG TAB            Past Social History:   Social History     Socioeconomic History    Marital status:    Tobacco Use    Smoking status: Never    Smokeless tobacco: Never   Substance and Sexual Activity    Alcohol use: Yes     Comment: OCC.    Drug use: Never    Sexual activity: Not Currently     Partners: Male     Birth control/protection: See Surgical Hx       Allergies:   Review of patient's allergies indicates:   Allergen Reactions    Neomycin-bacitracin-polymyxin      Other reaction(s): bumps on skin    Penicillins Rash    Triple antibiotic  [seyun-tqtwv-tjtpwal-pramoxine] Rash        Family History:   Family History   Problem Relation Age of Onset    Breast cancer Maternal Grandmother         Review of Systems:  Review of Systems   Constitutional:  Negative for activity change, appetite change, chills, diaphoresis,  fatigue, fever and unexpected weight change.   HENT:  Negative for congestion, dental problem, drooling, ear discharge, ear pain, facial swelling, hearing loss, mouth sores, nosebleeds, postnasal drip, rhinorrhea, sinus pressure, sinus pain, sneezing, sore throat, tinnitus, trouble swallowing and voice change.    Eyes:  Negative for photophobia, pain, discharge, redness, itching and visual disturbance.   Respiratory:  Negative for apnea, cough, choking, chest tightness, shortness of breath, wheezing and stridor.    Cardiovascular:  Negative for chest pain and leg swelling.   Gastrointestinal:  Negative for abdominal distention, abdominal pain, anal bleeding, blood in stool, constipation, diarrhea, nausea, rectal pain and vomiting.   Endocrine: Negative for cold intolerance, heat intolerance, polydipsia, polyphagia and polyuria.   Genitourinary: Negative.  Negative for decreased urine volume, difficulty urinating, dyspareunia, dysuria, enuresis, flank pain, frequency, genital sores, hematuria, menstrual problem, pelvic pain, urgency, vaginal bleeding, vaginal discharge and vaginal pain.   Musculoskeletal:  Negative for arthralgias, back pain, gait problem, joint swelling, myalgias, neck pain and neck stiffness.   Skin:  Negative for color change, pallor, rash and wound.   Allergic/Immunologic: Negative for environmental allergies, food allergies and immunocompromised state.   Neurological:  Negative for dizziness, tremors, seizures, syncope, facial asymmetry, speech difficulty, weakness, light-headedness, numbness and headaches.   Hematological:  Negative for adenopathy. Does not bruise/bleed easily.   Psychiatric/Behavioral:  Negative for agitation, behavioral problems, confusion, decreased concentration, dysphoric mood, hallucinations, self-injury, sleep disturbance and suicidal ideas. The patient is not nervous/anxious and is not hyperactive.      Physical Exam:  Physical Exam  Exam conducted with a chaperone  present.   Constitutional:       Appearance: Normal appearance.   HENT:      Head: Normocephalic.      Nose: Nose normal.      Mouth/Throat:      Mouth: Mucous membranes are moist.      Pharynx: Oropharynx is clear.   Eyes:      Extraocular Movements: Extraocular movements intact.      Conjunctiva/sclera: Conjunctivae normal.      Pupils: Pupils are equal, round, and reactive to light.   Cardiovascular:      Rate and Rhythm: Normal rate and regular rhythm.      Pulses: Normal pulses.      Heart sounds: Normal heart sounds.   Pulmonary:      Effort: Pulmonary effort is normal.      Breath sounds: Normal breath sounds.   Abdominal:      General: Abdomen is flat. Bowel sounds are normal.      Palpations: Abdomen is soft.      Hernia: There is no hernia in the left inguinal area or right inguinal area.   Genitourinary:     General: Normal vulva.      Pubic Area: No rash or pubic lice.       Labia:         Right: No rash, tenderness, lesion or injury.         Left: No rash, tenderness, lesion or injury.       Urethra: No prolapse, urethral pain, urethral swelling or urethral lesion.      Rectum: Normal.   Musculoskeletal:         General: Normal range of motion.      Cervical back: Normal range of motion and neck supple.   Lymphadenopathy:      Lower Body: No right inguinal adenopathy. No left inguinal adenopathy.   Skin:     General: Skin is warm and dry.      Capillary Refill: Capillary refill takes less than 2 seconds.   Neurological:      General: No focal deficit present.      Mental Status: She is alert and oriented to person, place, and time. Mental status is at baseline.   Psychiatric:         Mood and Affect: Mood normal.         Behavior: Behavior normal.         Thought Content: Thought content normal.         Judgment: Judgment normal.       Assessment/Plan:     Atrophic vaginitis and urinary tract infection:  Urinalysis negative for infection today.  Patient has no complaints.  She can call when she needs a  refill on her Estrace cream.  Follow up in 1 year    I, Dr. Samir Siddiqui have seen and personally evaluated the patient. I have formulated the plan reviewed all pertinent imaging and clinical data.  I agree with the nurse practitioner's assessment, and I have personally formulated the plan for this patient's care as described by the midlevel.  Problem List Items Addressed This Visit    None  Visit Diagnoses       History of UTI    -  Primary    Relevant Orders    POCT Urinalysis(Instrument) (Completed)

## 2023-07-17 ENCOUNTER — TELEPHONE (OUTPATIENT)
Dept: OBSTETRICS AND GYNECOLOGY | Facility: CLINIC | Age: 78
End: 2023-07-17
Payer: MEDICARE

## 2023-07-17 NOTE — TELEPHONE ENCOUNTER
----- Message from Aurea Beck sent at 7/17/2023  8:23 AM CDT -----  Type:  Needs Medical Advice    Who Called: Dottie Xie  Symptoms (please be specific): -   How long has patient had these symptoms:  -  Pharmacy name and phone #:  -  Would the patient rather a call back or a response via [facility]?    Best Call Back Number: [phone]    Additional Information: pt needs to speak w/ nurse please call

## 2023-07-17 NOTE — TELEPHONE ENCOUNTER
Called and spoke with patient in regards to her message. Pt adv Dr. Johnson is not currently accepting her ins at this time. Pt is aware and verbalized understanding. Radha Champion

## 2024-06-11 ENCOUNTER — OFFICE VISIT (OUTPATIENT)
Dept: UROLOGY | Facility: CLINIC | Age: 79
End: 2024-06-11
Payer: MEDICARE

## 2024-06-11 VITALS
HEIGHT: 61 IN | HEART RATE: 64 BPM | DIASTOLIC BLOOD PRESSURE: 63 MMHG | SYSTOLIC BLOOD PRESSURE: 149 MMHG | WEIGHT: 140 LBS | BODY MASS INDEX: 26.43 KG/M2

## 2024-06-11 DIAGNOSIS — N39.0 RECURRENT UTI: Primary | ICD-10-CM

## 2024-06-11 LAB
BILIRUBIN, UA POC OHS: NEGATIVE
BLOOD, UA POC OHS: NEGATIVE
CLARITY, UA POC OHS: CLEAR
COLOR, UA POC OHS: YELLOW
GLUCOSE, UA POC OHS: NEGATIVE
KETONES, UA POC OHS: NEGATIVE
LEUKOCYTES, UA POC OHS: ABNORMAL
NITRITE, UA POC OHS: NEGATIVE
PH, UA POC OHS: 7
PROTEIN, UA POC OHS: NEGATIVE
SPECIFIC GRAVITY, UA POC OHS: 1.02
UROBILINOGEN, UA POC OHS: 0.2

## 2024-06-11 PROCEDURE — 81003 URINALYSIS AUTO W/O SCOPE: CPT | Mod: QW,S$GLB,, | Performed by: UROLOGY

## 2024-06-11 PROCEDURE — 99213 OFFICE O/P EST LOW 20 MIN: CPT | Mod: S$GLB,,, | Performed by: UROLOGY

## 2024-06-11 NOTE — PROGRESS NOTES
Subjective:       Patient ID: Dottie Xie is a 78 y.o. female.    Chief Complaint: Urinary Tract Infection      HPI:  78-year-old female with a history of recurrent UTIs she was treated with vaginal estrogen cream however she has been doing well has not been taking that has not had any issues recently    Past Medical History:   Past Medical History:   Diagnosis Date    Atrophic vaginitis     Bone disorder     HTN (hypertension)     Insomnia     Menopausal syndrome     Osteopenia        Past Surgical Historical:   Past Surgical History:   Procedure Laterality Date    BREAST LUMPECTOMY Right     CATARACT EXTRACTION      FOOT SURGERY      hemangioma removal      LEG SURGERY      TOTAL ABDOMINAL HYSTERECTOMY      WITHOUT BSO        Medications:   Medication List with Changes/Refills   Current Medications    ESTRADIOL (ESTRACE) 0.01 % (0.1 MG/GRAM) VAGINAL CREAM    APPLY 1 GRAM VAGINALLY EVERY 7 DAYS Strength: 0.01 % (0.1 mg/gram)    LOSARTAN-HYDROCHLOROTHIAZIDE 100-12.5 MG (HYZAAR) 100-12.5 MG TAB            Past Social History:   Social History     Socioeconomic History    Marital status:    Tobacco Use    Smoking status: Never    Smokeless tobacco: Never   Substance and Sexual Activity    Alcohol use: Yes     Comment: OCC.    Drug use: Never    Sexual activity: Not Currently     Partners: Male     Birth control/protection: See Surgical Hx       Allergies:   Review of patient's allergies indicates:   Allergen Reactions    Neomycin-bacitracin-polymyxin      Other reaction(s): bumps on skin    Penicillins Rash    Triple antibiotic  [vmloz-qvyar-tehyxla-pramoxine] Rash        Family History:   Family History   Problem Relation Name Age of Onset    Breast cancer Maternal Grandmother          Review of Systems:  Review of Systems   Constitutional:  Negative for activity change and appetite change.   HENT:  Negative for congestion and dental problem.    Respiratory:  Negative for chest tightness and shortness of  breath.    Cardiovascular:  Negative for chest pain.   Gastrointestinal:  Negative for abdominal distention and abdominal pain.   Genitourinary:  Negative for decreased urine volume, difficulty urinating, dyspareunia, dysuria, enuresis, flank pain, frequency, genital sores, hematuria, pelvic pain and urgency.   Musculoskeletal:  Negative for back pain and neck pain.   Allergic/Immunologic: Negative for immunocompromised state.   Neurological:  Negative for dizziness.   Hematological:  Negative for adenopathy.   Psychiatric/Behavioral:  Negative for agitation, behavioral problems and confusion.        Physical Exam:  Physical Exam  Constitutional:       Appearance: She is well-developed.   HENT:      Head: Normocephalic and atraumatic.      Right Ear: External ear normal.      Left Ear: External ear normal.   Eyes:      Conjunctiva/sclera: Conjunctivae normal.   Neck:      Vascular: No JVD.   Cardiovascular:      Rate and Rhythm: Normal rate and regular rhythm.   Pulmonary:      Effort: Pulmonary effort is normal. No respiratory distress.      Breath sounds: Normal breath sounds. No wheezing.   Abdominal:      General: There is no distension.      Palpations: Abdomen is soft.      Tenderness: There is no abdominal tenderness. There is no rebound.   Musculoskeletal:         General: Normal range of motion.      Cervical back: Normal range of motion.   Skin:     General: Skin is warm and dry.      Findings: No erythema or rash.   Neurological:      Mental Status: She is alert and oriented to person, place, and time.   Psychiatric:         Behavior: Behavior normal.         Assessment/Plan:       Problem List Items Addressed This Visit    None  Visit Diagnoses       Recurrent UTI    -  Primary               Recurrent UTIs patient has stopped her vaginal estrogen cream continues to do well without any medicine has not had any urinary tract infection.  Return to clinic in 1 year follow-up

## 2025-06-12 ENCOUNTER — OFFICE VISIT (OUTPATIENT)
Dept: UROLOGY | Facility: CLINIC | Age: 80
End: 2025-06-12
Payer: MEDICARE

## 2025-06-12 VITALS
BODY MASS INDEX: 25.49 KG/M2 | WEIGHT: 135 LBS | DIASTOLIC BLOOD PRESSURE: 73 MMHG | HEIGHT: 61 IN | HEART RATE: 61 BPM | SYSTOLIC BLOOD PRESSURE: 172 MMHG

## 2025-06-12 DIAGNOSIS — N39.0 RECURRENT UTI: Primary | ICD-10-CM

## 2025-06-12 PROCEDURE — 99214 OFFICE O/P EST MOD 30 MIN: CPT | Mod: S$PBB,,, | Performed by: UROLOGY

## 2025-06-12 NOTE — PROGRESS NOTES
Subjective:       Patient ID: Dottie Xie is a 79 y.o. female.    Chief Complaint: No chief complaint on file.      HPI:  79-year-old female history of recurrent urinary tract infections she was using vaginal estrogen cream for quite some time but no longer is using this she has not had any infections over the past year today her urinalysis is completely normal    Past Medical History:   Past Medical History:   Diagnosis Date    Atrophic vaginitis     Bone disorder     HTN (hypertension)     Insomnia     Menopausal syndrome     Osteopenia        Past Surgical Historical:   Past Surgical History:   Procedure Laterality Date    BREAST LUMPECTOMY Right     CATARACT EXTRACTION      FOOT SURGERY      hemangioma removal      LEG SURGERY      TOTAL ABDOMINAL HYSTERECTOMY      WITHOUT BSO        Medications:   Medication List with Changes/Refills   Current Medications    ESTRADIOL (ESTRACE) 0.01 % (0.1 MG/GRAM) VAGINAL CREAM    APPLY 1 GRAM VAGINALLY EVERY 7 DAYS Strength: 0.01 % (0.1 mg/gram)    LOSARTAN-HYDROCHLOROTHIAZIDE 100-12.5 MG (HYZAAR) 100-12.5 MG TAB            Past Social History: Social History[1]    Allergies:   Review of patient's allergies indicates:   Allergen Reactions    Neomycin-bacitracin-polymyxin      Other reaction(s): bumps on skin    Penicillins Rash    Triple antibiotic  [quhpj-mknht-pwzlwjd-pramoxine] Rash        Family History:   Family History   Problem Relation Name Age of Onset    Breast cancer Maternal Grandmother          Review of Systems:  Review of Systems   Constitutional:  Negative for activity change and appetite change.   HENT:  Negative for congestion and dental problem.    Respiratory:  Negative for chest tightness and shortness of breath.    Cardiovascular:  Negative for chest pain.   Gastrointestinal:  Negative for abdominal distention and abdominal pain.   Genitourinary:  Negative for decreased urine volume, difficulty urinating, dyspareunia, dysuria, enuresis, flank pain,  frequency, genital sores, hematuria, pelvic pain and urgency.   Musculoskeletal:  Negative for back pain and neck pain.   Allergic/Immunologic: Negative for immunocompromised state.   Neurological:  Negative for dizziness.   Hematological:  Negative for adenopathy.   Psychiatric/Behavioral:  Negative for agitation, behavioral problems and confusion.        Physical Exam:  Physical Exam  Constitutional:       Appearance: She is well-developed.   HENT:      Head: Normocephalic and atraumatic.      Right Ear: External ear normal.      Left Ear: External ear normal.   Eyes:      Conjunctiva/sclera: Conjunctivae normal.   Neck:      Vascular: No JVD.   Cardiovascular:      Rate and Rhythm: Normal rate and regular rhythm.   Pulmonary:      Effort: Pulmonary effort is normal. No respiratory distress.      Breath sounds: Normal breath sounds. No wheezing.   Abdominal:      General: There is no distension.      Palpations: Abdomen is soft.      Tenderness: There is no abdominal tenderness. There is no rebound.   Musculoskeletal:         General: Normal range of motion.      Cervical back: Normal range of motion.   Skin:     General: Skin is warm and dry.      Findings: No erythema or rash.   Neurological:      Mental Status: She is alert and oriented to person, place, and time.   Psychiatric:         Behavior: Behavior normal.         Assessment/Plan:       Problem List Items Addressed This Visit    None  Visit Diagnoses         Recurrent UTI    -  Primary    Relevant Orders    POCT Urinalysis(Instrument)               79-year-old female with a history of recurrent UTIs she has done very well over the past year urinalysis today is negative return to clinic in 1 year         [1]   Social History  Socioeconomic History    Marital status:    Tobacco Use    Smoking status: Never    Smokeless tobacco: Never   Substance and Sexual Activity    Alcohol use: Yes     Comment: OCC.    Drug use: Never    Sexual activity: Not  Currently     Partners: Male     Birth control/protection: See Surgical Hx